# Patient Record
Sex: FEMALE | Race: WHITE
[De-identification: names, ages, dates, MRNs, and addresses within clinical notes are randomized per-mention and may not be internally consistent; named-entity substitution may affect disease eponyms.]

---

## 2021-10-17 ENCOUNTER — HOSPITAL ENCOUNTER (INPATIENT)
Dept: HOSPITAL 35 - ER | Age: 61
LOS: 2 days | Discharge: HOME | DRG: 392 | End: 2021-10-19
Attending: HOSPITALIST | Admitting: HOSPITALIST
Payer: COMMERCIAL

## 2021-10-17 VITALS — HEIGHT: 63 IN | BODY MASS INDEX: 25.69 KG/M2 | WEIGHT: 145 LBS

## 2021-10-17 VITALS — DIASTOLIC BLOOD PRESSURE: 88 MMHG | SYSTOLIC BLOOD PRESSURE: 142 MMHG

## 2021-10-17 DIAGNOSIS — K52.9: ICD-10-CM

## 2021-10-17 DIAGNOSIS — R73.9: ICD-10-CM

## 2021-10-17 DIAGNOSIS — I10: ICD-10-CM

## 2021-10-17 DIAGNOSIS — Z20.822: ICD-10-CM

## 2021-10-17 DIAGNOSIS — E87.1: ICD-10-CM

## 2021-10-17 DIAGNOSIS — K29.00: Primary | ICD-10-CM

## 2021-10-17 DIAGNOSIS — J45.909: ICD-10-CM

## 2021-10-17 DIAGNOSIS — E86.0: ICD-10-CM

## 2021-10-17 DIAGNOSIS — M19.90: ICD-10-CM

## 2021-10-17 DIAGNOSIS — E87.6: ICD-10-CM

## 2021-10-17 PROCEDURE — 10040 EXTRACTION: CPT

## 2021-10-17 NOTE — EMS
[facility]
[address] [place]   [address]                     EMS Patient Care Report       
_______________________________________________________________________________
 
Name:       WILLIAM GUPTA              Room #:                     REG KELLEE BRO#:      [id number]                       Account #:      [id number]  
Admission:  10/17/21    Attend Phys:                          
Discharge:              Date of Birth:  [birth year]  
                                                          Report #: [id number]
                                                                    [id number]
_______________________________________________________________________________
THIS REPORT FOR:   //name//                          
 
Report Transmitted: 10/17/2021 22:57
EMS Care Summary
[facility]
Incident [id number] @ 10/17/2021 22:24
 
Incident Location
[address] [address]
 
Patient
WILLIAM GUPTA
Female, 61 Years
 1960
 
Patient Address
[address] [place] [id number]
 
Patient History
Other,Hypertension (HTN),
 
Patient Allergies
No known allergies,
 
Patient Medications
Zofran, Other,
 
Chief Complaint
nausea/vomiting
 
Disposition
Transported No Lights/[place]
 
Dispatch Reason
Sick Person
 
Transported To
[facility]
 
Narrative
Pt says that she flew in from [facility] two days ago.  She says that she felt 
fine when she came to .  She says that today, she has been "fighting' feeling 
sick all day.  She says that she [place] her [place] the [place] 
 
 
 
[facility]
[address] [place]   [address]                     EMS Patient Care Report       
_______________________________________________________________________________
 
Name:       WILLIAM GUPTA              Room #:                     ETHAN BRO#:      [id number]                       Account #:      [id number]  
Admission:  10/17/21    Attend Phys:                          
Discharge:              Date of Birth:  [birth year]  
                                                          Report #: [id number]
                                                                    [id number]
_______________________________________________________________________________
today.  When she got back to the hotel this evening, she says that she became 
very nauseated and had abdominal cramping for a little while.  She says that 
after she had diarrhea, the abdominal cramps went away.  She denies any 
discomfort in her chest or any trouble breathing.  She has not been vaccinated 
for Covid 19.  She has not been tested for it recently for any reason either.  
She says that she has a past history of getting "dehydrated" very easily.  She 
says that they usually give her some fluids and then send her home when this 
happens.  She says that they do not know why she gets dehydrated so easily.  
She stated that she tried to keep up on fluids today.  She took one of her own 
Zofran pills a few hours ago while only helped "a little".  She would like to 
go to the hospital for further evaluation and treatment. 
 
VS.  Exam.  EKG.  Secured to cot.  Transported without incident to [place].   
established enroute.  ER contacted enroute.  r/r no further orders.  Released 
with report in ER11. 
 
Initial Vitals
@22:54P: 88,BP: 168/97,SpO2: 95,
@22:36P: 84,R: 20,BP: 160/75,Pain: 0/10,GCS: 15,SpO2: 97,Revised Trauma: 12,
@22:58P: 85,R: 20,Pain: 0/10,GCS: 15,SpO2: 97,
@22:51P: 85,R: 20,GCS: 15,SpO2: 98,
@22:38P: 83,R: 20,GCS: 15,Temp: 96.6F,Glucose: 118,SpO2: 98,
 
 
Impression
Nausea
 
Procedures
@22:55Saline Lock 10cc (20 ga) Site: Antecubital-RightResponse: 
UnchangedSucceeded@22:38Surgical Mask on PatientResponse: 
Unchanged@22:50Ondansetron - 4 Milligrams (mg) - OralResponse: Improved 
 
Timeline
22:22,Call Received
22:22,Psap Call
22:24,Dispatched
22:26,En Route
22:31,On Scene
22:35,At Patient
22:36,BP: 160/75 M,PULSE: 84,RR: 20 R,SPO2: 97 Ox,ETCO2:  ,BG: ,PAIN: 0,GCS: 15,
22:38,Surgical Mask on Patient,Response: Unchanged
22:38,BP: / M,PULSE: 83,RR: 20 R,SPO2: 98 Ox,ETCO2:  ,B,PAIN: ,GCS: 15,
22:47,Depart Scene
22:50,Ondansetron - 4 Milligrams (mg) - Oral,Response: Improved
22:51,BP: / M,PULSE: 85,RR: 20 R,SPO2: 98 Ox,ETCO2:  ,BG: ,PAIN: ,GCS: 15,
22:54,BP: 168/97 M,PULSE: 88,RR:  R,SPO2: 95 Ox,ETCO2:  ,BG: ,PAIN: ,GCS: ,
 
 
 
[address]   [address]                     EMS Patient Care Report       
_______________________________________________________________________________
 
Name:       WILLIAM GUPTA              Room #:                     REG KLELEE BRO#:      [id number]                       Account #:      [id number]  
Admission:  10/17/21    Attend Phys:                          
Discharge:              Date of Birth:  [birth year]  
                                                          Report #: [id number]
                                                                    [id number]
_______________________________________________________________________________
22:55,Saline Lock 10cc 20 ga Site: Antecubital-Right,Response: 
UnchangedSucceeded, 
22:58,BP: / M,PULSE: 85,RR: 20 R,SPO2: 97 Ox,ETCO2:  ,BG: ,PAIN: 0,GCS: 15,
23:00,At Destination
23:27,Call Closed
 
Disclaimer
v1.1     Copyright  [employer]
This EMS Care Summary contains data elements from the applicable legal record 
(which may be displayed differently). It is designed to provide pertinent 
information for the following purposes: continuity of care, clinical quality, 
and state data reporting. The complete legal record is available to ED staff 
and administrators of the receiving hospital in [employer] Patient Tracker. All data 
is provided "as is."

## 2021-10-18 VITALS — SYSTOLIC BLOOD PRESSURE: 166 MMHG | DIASTOLIC BLOOD PRESSURE: 92 MMHG

## 2021-10-18 VITALS — DIASTOLIC BLOOD PRESSURE: 61 MMHG | SYSTOLIC BLOOD PRESSURE: 141 MMHG

## 2021-10-18 VITALS — SYSTOLIC BLOOD PRESSURE: 147 MMHG | DIASTOLIC BLOOD PRESSURE: 81 MMHG

## 2021-10-18 VITALS — SYSTOLIC BLOOD PRESSURE: 140 MMHG | DIASTOLIC BLOOD PRESSURE: 69 MMHG

## 2021-10-18 LAB
ANION GAP SERPL CALC-SCNC: 11 MMOL/L (ref 7–16)
BASOPHILS NFR BLD AUTO: 0.5 % (ref 0–2)
BUN SERPL-MCNC: 10 MG/DL (ref 7–18)
CALCIUM SERPL-MCNC: 8.6 MG/DL (ref 8.5–10.1)
CHLORIDE SERPL-SCNC: 89 MMOL/L (ref 98–107)
CO2 SERPL-SCNC: 25 MMOL/L (ref 21–32)
CREAT SERPL-MCNC: 0.7 MG/DL (ref 0.6–1)
EOSINOPHIL NFR BLD: 0.4 % (ref 0–3)
ERYTHROCYTE [DISTWIDTH] IN BLOOD BY AUTOMATED COUNT: 12.9 % (ref 10.5–14.5)
GLUCOSE SERPL-MCNC: 153 MG/DL (ref 74–106)
GRANULOCYTES NFR BLD MANUAL: 73.4 % (ref 36–66)
HCT VFR BLD CALC: 39.9 % (ref 37–47)
HGB BLD-MCNC: 14.3 GM/DL (ref 12–15)
LYMPHOCYTES NFR BLD AUTO: 20.8 % (ref 24–44)
MCH RBC QN AUTO: 33 PG (ref 26–34)
MCHC RBC AUTO-ENTMCNC: 35.9 G/DL (ref 28–37)
MCV RBC: 91.9 FL (ref 80–100)
MONOCYTES NFR BLD: 4.9 % (ref 1–8)
NEUTROPHILS # BLD: 6 THOU/UL (ref 1.4–8.2)
PLATELET # BLD: 228 THOU/UL (ref 150–400)
POTASSIUM SERPL-SCNC: 2.6 MMOL/L (ref 3.5–5.1)
RBC # BLD AUTO: 4.34 MIL/UL (ref 4.2–5)
SODIUM SERPL-SCNC: 125 MMOL/L (ref 136–145)
WBC # BLD AUTO: 8.2 THOU/UL (ref 4–11)

## 2021-10-18 NOTE — NUR
Pt is A & O x4. Pt VS stable. Pt received medications as ordered and also
received PRN medications. Pt is SBA with cares and ADLs. Pt is new admit
today. Pt had N/V this shift. Pt is able to make needs known. Pt daughter at
bedside.

## 2021-10-18 NOTE — EKG
[address] [employer]
[place]  [address]
Phone:  [phone]                    ELECTROCARDIOGRAM REPORT      
_______________________________________________________________________________
 
Name:       WILLIAM GUPTA              Room #:         458-P       ADM IN  
M.R.#:      [id number]     Account #:      [id number]  
Admission:  10/18/21    Attend Phys:    Ayo Zhu MD    
Discharge:              Date of Birth:  [birth year]  
                                                          Report #: [id number]
   [id number]
_______________________________________________________________________________
                         [facility] ED
                                       
Test Date:    2021-10-18               Test Time:    01:27:02
Pat Name:     WILLIAM GUPTA         Department:   
Patient ID:   [id number]            Room:         [facility]
Gender:       F                        Technician:   DUDLEY
:          1960               Requested By: Wilder Link
Order Number: [id number] MD:   Robert Jean
                                 Measurements
Intervals                              Axis          
Rate:         80                       P:            66
KS:           174                      QRS:          4
QRSD:         82                       T:            4
QT:           428                                    
QTc:          494                                    
                           Interpretive Statements
Sinus rhythm
RSR' in V1 or V2, probably normal variant
Borderline prolonged QT interval
Baseline wander in lead(s) V3
No previous ECG available for comparison
Electronically Signed On 10- 7:34:02 CDT by Robert Jean
[email or web address]
 
 
 
 
 
 
 
 
 
 
 
 
 
 
 
 
 
 
 
 
  <ELECTRONICALLY SIGNED>
   By: Robert Jean MD, [facility]    
  10/18/21     0734
D: 10/18/21 0127                           _____________________________________
T: 10/18/21 0127                           Robert Jean MD, FAC      /EPI

## 2021-10-19 VITALS — DIASTOLIC BLOOD PRESSURE: 86 MMHG | SYSTOLIC BLOOD PRESSURE: 138 MMHG

## 2021-10-19 VITALS — DIASTOLIC BLOOD PRESSURE: 69 MMHG | SYSTOLIC BLOOD PRESSURE: 140 MMHG

## 2021-10-19 LAB
ANION GAP SERPL CALC-SCNC: 7 MMOL/L (ref 7–16)
BUN SERPL-MCNC: 11 MG/DL (ref 7–18)
CALCIUM SERPL-MCNC: 7.9 MG/DL (ref 8.5–10.1)
CHLORIDE SERPL-SCNC: 107 MMOL/L (ref 98–107)
CO2 SERPL-SCNC: 27 MMOL/L (ref 21–32)
CREAT SERPL-MCNC: 0.8 MG/DL (ref 0.6–1)
EST. AVERAGE GLUCOSE BLD GHB EST-MCNC: 108 MG/DL
GLUCOSE SERPL-MCNC: 105 MG/DL (ref 74–106)
GLYCOHEMOGLOBIN (HGB A1C): 5.4 % (ref 4.8–5.6)
POTASSIUM SERPL-SCNC: 3.7 MMOL/L (ref 3.5–5.1)
SODIUM SERPL-SCNC: 141 MMOL/L (ref 136–145)

## 2021-10-19 NOTE — NUR
Working on the discharge paper, it says [facility] has printed prescription, the
staff can not find it, Dr. Zhu has been notified, awaiting reposnse.

## 2021-10-19 NOTE — NUR
PT CARE ASSUMED WITH PT IN BED WITH DAUGHTER AT BEDSIDE.PT IS A/O X4.PT IS UP
WITH X1 ASSIST TO BSC.IV ACCESS ON RT AC WITH NS AT 100CC/HR.PT C/O BACK ACHE
AND PAIN MANAGED WITH IBUPROFEN PER PT REQUEST.PT DENIED N/V.WILL CONTINUE
TO MONITOR PER POC